# Patient Record
Sex: MALE | Race: WHITE | NOT HISPANIC OR LATINO | Employment: FULL TIME | URBAN - METROPOLITAN AREA
[De-identification: names, ages, dates, MRNs, and addresses within clinical notes are randomized per-mention and may not be internally consistent; named-entity substitution may affect disease eponyms.]

---

## 2021-05-28 ENCOUNTER — RECORDS - HEALTHEAST (OUTPATIENT)
Dept: ADMINISTRATIVE | Facility: CLINIC | Age: 36
End: 2021-05-28

## 2025-03-25 ENCOUNTER — HOSPITAL ENCOUNTER (EMERGENCY)
Facility: CLINIC | Age: 40
Discharge: HOME | End: 2025-03-25
Attending: EMERGENCY MEDICINE
Payer: COMMERCIAL

## 2025-03-25 ENCOUNTER — APPOINTMENT (OUTPATIENT)
Dept: CT IMAGING | Facility: CLINIC | Age: 40
End: 2025-03-25
Attending: EMERGENCY MEDICINE
Payer: COMMERCIAL

## 2025-03-25 VITALS
BODY MASS INDEX: 29.84 KG/M2 | OXYGEN SATURATION: 100 % | TEMPERATURE: 97.3 F | SYSTOLIC BLOOD PRESSURE: 131 MMHG | HEART RATE: 55 BPM | WEIGHT: 240 LBS | DIASTOLIC BLOOD PRESSURE: 71 MMHG | HEIGHT: 75 IN | RESPIRATION RATE: 17 BRPM

## 2025-03-25 DIAGNOSIS — R07.89 ATYPICAL CHEST PAIN: ICD-10-CM

## 2025-03-25 LAB
ALBUMIN SERPL BCG-MCNC: 4.7 G/DL (ref 3.5–5.2)
ALP SERPL-CCNC: 82 U/L (ref 40–150)
ALT SERPL W P-5'-P-CCNC: 48 U/L (ref 0–70)
ANION GAP SERPL CALCULATED.3IONS-SCNC: 11 MMOL/L (ref 7–15)
AST SERPL W P-5'-P-CCNC: 32 U/L (ref 0–45)
ATRIAL RATE - MUSE: 63 BPM
BASOPHILS # BLD AUTO: 0.1 10E3/UL (ref 0–0.2)
BASOPHILS NFR BLD AUTO: 1 %
BILIRUB DIRECT SERPL-MCNC: <0.08 MG/DL (ref 0–0.3)
BILIRUB SERPL-MCNC: 0.2 MG/DL
BUN SERPL-MCNC: 12.1 MG/DL (ref 6–20)
CALCIUM SERPL-MCNC: 9.8 MG/DL (ref 8.8–10.4)
CHLORIDE SERPL-SCNC: 100 MMOL/L (ref 98–107)
CREAT SERPL-MCNC: 1.04 MG/DL (ref 0.67–1.17)
DIASTOLIC BLOOD PRESSURE - MUSE: NORMAL MMHG
EGFRCR SERPLBLD CKD-EPI 2021: >90 ML/MIN/1.73M2
EOSINOPHIL # BLD AUTO: 0.5 10E3/UL (ref 0–0.7)
EOSINOPHIL NFR BLD AUTO: 6 %
ERYTHROCYTE [DISTWIDTH] IN BLOOD BY AUTOMATED COUNT: 12.6 % (ref 10–15)
GLUCOSE SERPL-MCNC: 102 MG/DL (ref 70–99)
HCO3 SERPL-SCNC: 29 MMOL/L (ref 22–29)
HCT VFR BLD AUTO: 45.7 % (ref 40–53)
HGB BLD-MCNC: 15.4 G/DL (ref 13.3–17.7)
HOLD SPECIMEN: NORMAL
IMM GRANULOCYTES # BLD: 0 10E3/UL
IMM GRANULOCYTES NFR BLD: 0 %
INTERPRETATION ECG - MUSE: NORMAL
LYMPHOCYTES # BLD AUTO: 2.2 10E3/UL (ref 0.8–5.3)
LYMPHOCYTES NFR BLD AUTO: 29 %
MCH RBC QN AUTO: 28.8 PG (ref 26.5–33)
MCHC RBC AUTO-ENTMCNC: 33.7 G/DL (ref 31.5–36.5)
MCV RBC AUTO: 85 FL (ref 78–100)
MONOCYTES # BLD AUTO: 0.7 10E3/UL (ref 0–1.3)
MONOCYTES NFR BLD AUTO: 9 %
NEUTROPHILS # BLD AUTO: 4.3 10E3/UL (ref 1.6–8.3)
NEUTROPHILS NFR BLD AUTO: 55 %
NRBC # BLD AUTO: 0 10E3/UL
NRBC BLD AUTO-RTO: 0 /100
P AXIS - MUSE: 53 DEGREES
PLATELET # BLD AUTO: 224 10E3/UL (ref 150–450)
POTASSIUM SERPL-SCNC: 4 MMOL/L (ref 3.4–5.3)
PR INTERVAL - MUSE: 182 MS
PROT SERPL-MCNC: 7.9 G/DL (ref 6.4–8.3)
QRS DURATION - MUSE: 116 MS
QT - MUSE: 398 MS
QTC - MUSE: 407 MS
R AXIS - MUSE: -21 DEGREES
RBC # BLD AUTO: 5.35 10E6/UL (ref 4.4–5.9)
SODIUM SERPL-SCNC: 140 MMOL/L (ref 135–145)
SYSTOLIC BLOOD PRESSURE - MUSE: NORMAL MMHG
T AXIS - MUSE: 14 DEGREES
TROPONIN T SERPL HS-MCNC: <6 NG/L
VENTRICULAR RATE- MUSE: 63 BPM
WBC # BLD AUTO: 7.8 10E3/UL (ref 4–11)

## 2025-03-25 PROCEDURE — 71275 CT ANGIOGRAPHY CHEST: CPT

## 2025-03-25 PROCEDURE — 82248 BILIRUBIN DIRECT: CPT | Performed by: EMERGENCY MEDICINE

## 2025-03-25 PROCEDURE — 99285 EMERGENCY DEPT VISIT HI MDM: CPT | Mod: 25

## 2025-03-25 PROCEDURE — 85025 COMPLETE CBC W/AUTO DIFF WBC: CPT | Performed by: EMERGENCY MEDICINE

## 2025-03-25 PROCEDURE — 84484 ASSAY OF TROPONIN QUANT: CPT | Performed by: EMERGENCY MEDICINE

## 2025-03-25 PROCEDURE — 82040 ASSAY OF SERUM ALBUMIN: CPT | Performed by: EMERGENCY MEDICINE

## 2025-03-25 PROCEDURE — 93005 ELECTROCARDIOGRAM TRACING: CPT | Performed by: EMERGENCY MEDICINE

## 2025-03-25 PROCEDURE — 82947 ASSAY GLUCOSE BLOOD QUANT: CPT | Performed by: EMERGENCY MEDICINE

## 2025-03-25 PROCEDURE — 82310 ASSAY OF CALCIUM: CPT | Performed by: EMERGENCY MEDICINE

## 2025-03-25 PROCEDURE — 82565 ASSAY OF CREATININE: CPT | Performed by: EMERGENCY MEDICINE

## 2025-03-25 PROCEDURE — 36415 COLL VENOUS BLD VENIPUNCTURE: CPT | Performed by: EMERGENCY MEDICINE

## 2025-03-25 PROCEDURE — 250N000011 HC RX IP 250 OP 636: Performed by: EMERGENCY MEDICINE

## 2025-03-25 RX ORDER — IOPAMIDOL 755 MG/ML
90 INJECTION, SOLUTION INTRAVASCULAR ONCE
Status: COMPLETED | OUTPATIENT
Start: 2025-03-25 | End: 2025-03-25

## 2025-03-25 RX ADMIN — IOPAMIDOL 90 ML: 755 INJECTION, SOLUTION INTRAVENOUS at 20:20

## 2025-03-25 ASSESSMENT — ACTIVITIES OF DAILY LIVING (ADL)
ADLS_ACUITY_SCORE: 41
ADLS_ACUITY_SCORE: 41

## 2025-03-25 ASSESSMENT — COLUMBIA-SUICIDE SEVERITY RATING SCALE - C-SSRS
6. HAVE YOU EVER DONE ANYTHING, STARTED TO DO ANYTHING, OR PREPARED TO DO ANYTHING TO END YOUR LIFE?: NO
1. IN THE PAST MONTH, HAVE YOU WISHED YOU WERE DEAD OR WISHED YOU COULD GO TO SLEEP AND NOT WAKE UP?: NO
2. HAVE YOU ACTUALLY HAD ANY THOUGHTS OF KILLING YOURSELF IN THE PAST MONTH?: NO

## 2025-03-26 NOTE — ED TRIAGE NOTES
"Patient presents to the ED with medial chest pain for the last week and a half and sensations of his left arm feeling \"cold\". Pain to chest goes to the back and is described as pressure/aching. EKG done in triage. Patient did take an aspirin 81 mg prior to arrival.        "

## 2025-03-26 NOTE — ED PROVIDER NOTES
"  Emergency Department Encounter     Evaluation Date & Time:   3/25/2025  7:42 PM    CHIEF COMPLAINT:  Chest Pain      Triage Note:Patient presents to the ED with medial chest pain for the last week and a half and sensations of his left arm feeling \"cold\". Pain to chest goes to the back and is described as pressure/aching. EKG done in triage. Patient did take an aspirin 81 mg prior to arrival.              ED COURSE & MEDICAL DECISION MAKING:     Pt here with central chest ache for the past week or so, some radiation into upper back and intermittently noticing some coolness to left fingers.  Pt does state pain is worse at times with palpation  Denies any real dyspnea, n/v.  Did arrive hypertensive.  Given history, consideration for aortic pathology, so getting CTA.  Otherwise, could be benign MSK related pain. Will get labs, CTA, treat symptomatically as needed.  Pt otherwise no in distress, does have intact pulses on my exam.      ED Course as of 03/25/25 2313   Tue Mar 25, 2025   2031 Repeat BP much improved.    Labs all reassuring, ACS ruled out with neg hsTrop.   2108 CTA neg.   2120 Pt re-evaluated, updated.  Discussed results, ibuprofen TID, outpatient follow up. Pt reassured.           Medical Decision Making    History:  Supplemental history from:   External Record(s) reviewed:     Work Up:  Chart documentation includes differential considered and any EKGs or imaging independently interpreted by provider, where specified.  In additional to work up documented, I considered the following work up:     External consultation:  Discussion of management with another provider:     Complicating factors:  Care impacted by chronic illness:   Care affected by social determinants of health: N/A    Disposition considerations: Discharge. No recommendations on prescription strength medication(s). See documentation for any additional details.    Not Applicable     None    At the conclusion of the encounter I discussed the " "results of all the tests and the disposition. The questions were answered. The patient or family acknowledged understanding and was agreeable with the care plan.      MEDICATIONS GIVEN IN THE EMERGENCY DEPARTMENT:  Medications   iopamidol (ISOVUE-370) solution 90 mL (90 mLs Intravenous $Given 3/25/25 2020)       NEW PRESCRIPTIONS STARTED AT TODAY'S ED VISIT:  There are no discharge medications for this patient.      HPI   HPI     Juan Hollingsworth is a 39 year old male with a pertinent history of psoriasis on biologic who presents to this ED via walk-in for evaluation of chest pain over the past week. Described as a dull, ache, fairly constant. Some radiation into upper back.  Reports intermittent coolness to left hand fingers. Denies weakness, abd pain, cough, fevers, dyspnea, n/v.  Pt has not been seen for this before today.  NO hx of dvt/pe, recent travel/surgery, leg pain/swelling.  Pt does state pain in chest can be worsened by some movements and palpation.  States he noticed it with bench pressing, for example.      REVIEW OF SYSTEMS:  See HPI      Medical History   No past medical history on file.    No past surgical history on file.    No family history on file.    Social History     Tobacco Use    Smoking status: Never   Substance Use Topics    Alcohol use: Yes       No current outpatient medications on file.      Physical Exam     Vitals:  /71   Pulse 55   Temp 97.3  F (36.3  C)   Resp 17   Ht 1.905 m (6' 3\")   Wt 108.9 kg (240 lb)   SpO2 100%   BMI 30.00 kg/m      PHYSICAL EXAM:   Physical Exam  Vitals and nursing note reviewed.   Constitutional:       General: He is not in acute distress.     Appearance: Normal appearance.   HENT:      Head: Normocephalic and atraumatic.      Nose: Nose normal.      Mouth/Throat:      Mouth: Mucous membranes are moist.   Eyes:      Pupils: Pupils are equal, round, and reactive to light.   Neck:      Vascular: No JVD.   Cardiovascular:      Rate and Rhythm: " Normal rate and regular rhythm.      Pulses: Normal pulses.           Radial pulses are 2+ on the right side and 2+ on the left side.        Dorsalis pedis pulses are 2+ on the right side and 2+ on the left side.   Pulmonary:      Effort: Pulmonary effort is normal. No respiratory distress.      Breath sounds: Normal breath sounds.   Chest:      Chest wall: Tenderness (mild) present.      Comments: No rash or deformity  Abdominal:      Palpations: Abdomen is soft.      Tenderness: There is no abdominal tenderness.   Musculoskeletal:      Cervical back: Full passive range of motion without pain and neck supple.      Comments: No calf tenderness or swelling b/l   Skin:     General: Skin is warm.      Findings: No rash.   Neurological:      General: No focal deficit present.      Mental Status: He is alert. Mental status is at baseline.      Comments: Fluent speech, no acute lateralizing deficits   Psychiatric:         Mood and Affect: Mood normal.         Behavior: Behavior normal.         Results     LAB:  All pertinent labs reviewed and interpreted  Labs Ordered and Resulted from Time of ED Arrival to Time of ED Departure   BASIC METABOLIC PANEL - Abnormal       Result Value    Sodium 140      Potassium 4.0      Chloride 100      Carbon Dioxide (CO2) 29      Anion Gap 11      Urea Nitrogen 12.1      Creatinine 1.04      GFR Estimate >90      Calcium 9.8      Glucose 102 (*)    HEPATIC FUNCTION PANEL - Normal    Protein Total 7.9      Albumin 4.7      Bilirubin Total 0.2      Alkaline Phosphatase 82      AST 32      ALT 48      Bilirubin Direct <0.08     TROPONIN T, HIGH SENSITIVITY - Normal    Troponin T, High Sensitivity <6     CBC WITH PLATELETS AND DIFFERENTIAL    WBC Count 7.8      RBC Count 5.35      Hemoglobin 15.4      Hematocrit 45.7      MCV 85      MCH 28.8      MCHC 33.7      RDW 12.6      Platelet Count 224      % Neutrophils 55      % Lymphocytes 29      % Monocytes 9      % Eosinophils 6      %  Basophils 1      % Immature Granulocytes 0      NRBCs per 100 WBC 0      Absolute Neutrophils 4.3      Absolute Lymphocytes 2.2      Absolute Monocytes 0.7      Absolute Eosinophils 0.5      Absolute Basophils 0.1      Absolute Immature Granulocytes 0.0      Absolute NRBCs 0.0         RADIOLOGY:  CTA Chest Abdomen Pelvis w Contrast   Final Result   IMPRESSION:   No evidence of acute aortic syndrome or other explanation for symptoms.                         ECG:  NSR, rate 63, normal intervals, no acute ischemia    I have independently reviewed and interpreted the EKG(s) documented above     PROCEDURES:  Procedures:  none      FINAL IMPRESSION:    ICD-10-CM    1. Atypical chest pain  R07.89           0 minutes of critical care time        Olivier Nash DO  Emergency Medicine  [facility] EMERGENCY ROOM  3/25/2025  7:42 PM          Olivier Nash MD  03/25/25 [id number]

## 2025-03-26 NOTE — DISCHARGE INSTRUCTIONS
Take ibuprofen 600mg three times a day for the next 5 days, then as needed. Take with food. Follow up with primary clinic. Return for new/worsening concerns.

## 2025-06-28 ENCOUNTER — HEALTH MAINTENANCE LETTER (OUTPATIENT)
Age: 40
End: 2025-06-28